# Patient Record
Sex: MALE | URBAN - METROPOLITAN AREA
[De-identification: names, ages, dates, MRNs, and addresses within clinical notes are randomized per-mention and may not be internally consistent; named-entity substitution may affect disease eponyms.]

---

## 2018-06-12 ENCOUNTER — OFFICE VISIT (OUTPATIENT)
Dept: URBAN - METROPOLITAN AREA CLINIC 48 | Facility: CLINIC | Age: 28
End: 2018-06-12
Payer: COMMERCIAL

## 2018-06-12 PROCEDURE — 99213 OFFICE O/P EST LOW 20 MIN: CPT | Performed by: OPHTHALMOLOGY

## 2018-06-12 ASSESSMENT — INTRAOCULAR PRESSURE
OD: 12
OS: 14

## 2018-06-12 NOTE — IMPRESSION/PLAN
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 Plan: No change in appearance of iris nevus OS. Photos done.

## 2018-12-21 ENCOUNTER — OFFICE VISIT (OUTPATIENT)
Dept: URBAN - METROPOLITAN AREA CLINIC 48 | Facility: CLINIC | Age: 28
End: 2018-12-21
Payer: COMMERCIAL

## 2018-12-21 DIAGNOSIS — H21.89 OTHER SPECIFIED DISORDERS OF IRIS AND CILIARY BODY: Primary | ICD-10-CM

## 2018-12-21 PROCEDURE — 92002 INTRM OPH EXAM NEW PATIENT: CPT | Performed by: OPTOMETRIST

## 2018-12-21 PROCEDURE — 92012 INTRM OPH EXAM EST PATIENT: CPT | Performed by: OPTOMETRIST

## 2018-12-21 ASSESSMENT — INTRAOCULAR PRESSURE
OS: 21
OD: 20

## 2018-12-21 NOTE — IMPRESSION/PLAN
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 Appears to be stable in size and shape over time. Plan: Recommend monitoring on yearly basis.